# Patient Record
Sex: MALE | Race: WHITE | NOT HISPANIC OR LATINO | Employment: OTHER | URBAN - NONMETROPOLITAN AREA
[De-identification: names, ages, dates, MRNs, and addresses within clinical notes are randomized per-mention and may not be internally consistent; named-entity substitution may affect disease eponyms.]

---

## 2021-09-08 NOTE — PROGRESS NOTES
Patient: Layo Cisneros    YOB: 1945    Date: 09/14/2021    Primary Care Provider: Nigel Alicia MD    Chief Complaint   Patient presents with   • Hernia     Left inguinal        SUBJECTIVE:    History of present illness: Patient with a several year history of left inguinal hernia.  May have increased in size slightly but recently was lifting a large amount and had some pain a couple days later which only lasted a short period of time.  He is otherwise asymptomatic and easily reduces when he is recumbent.  No nausea or vomiting.  Incidentally, he had a right inguinal hernia repair as a child.    The following portions of the patient's history were reviewed and updated as appropriate: allergies, current medications, past family history, past medical history, past social history, past surgical history and problem list.    Review of Systems   Constitutional: Negative for chills, fever and unexpected weight change.   HENT: Negative for trouble swallowing and voice change.    Eyes: Negative for visual disturbance.   Respiratory: Negative for apnea, cough, chest tightness, shortness of breath and wheezing.    Cardiovascular: Negative for chest pain, palpitations and leg swelling.   Gastrointestinal: Positive for abdominal pain. Negative for abdominal distention, anal bleeding, blood in stool, constipation, diarrhea, nausea, rectal pain and vomiting.   Endocrine: Negative for cold intolerance and heat intolerance.   Genitourinary: Negative for difficulty urinating, dysuria, flank pain, scrotal swelling and testicular pain.   Musculoskeletal: Positive for back pain. Negative for gait problem and joint swelling.   Skin: Negative for color change, rash and wound.   Neurological: Negative for dizziness, syncope, speech difficulty, weakness, numbness and headaches.   Hematological: Negative for adenopathy. Does not bruise/bleed easily.   Psychiatric/Behavioral: Negative for confusion. The patient is not  "nervous/anxious.        History:  Past Medical History:   Diagnosis Date   • Cancer (CMS/HCC)     skin   • Hypertension        Past Surgical History:   Procedure Laterality Date   • BLADDER SURGERY     • EYE SURGERY      glaucoma   • HERNIA REPAIR  1950   • KIDNEY SURGERY Right        Family History   Problem Relation Age of Onset   • Cancer Mother         breast        Social History     Tobacco Use   • Smoking status: Former Smoker   • Smokeless tobacco: Former User   Vaping Use   • Vaping Use: Never used   Substance Use Topics   • Alcohol use: Not Currently   • Drug use: Defer       Allergies:  Allergies   Allergen Reactions   • Contrast Dye Anaphylaxis   • Bactrim [Sulfamethoxazole-Trimethoprim] Nausea And Vomiting   • Codeine Other (See Comments)     Syncope         Medications:    Current Outpatient Medications:   •  alfuzosin (UROXATRAL) 10 MG 24 hr tablet, Take 10 mg by mouth Daily., Disp: , Rfl:   •  Ascorbic Acid (HIPOLITO-C PO), Take  by mouth., Disp: , Rfl:   •  Bergamot Oil oil, , Disp: , Rfl:   •  famotidine (PEPCID) 10 MG tablet, Take 10 mg by mouth 2 (Two) Times a Day., Disp: , Rfl:   •  finasteride (PROSCAR) 5 MG tablet, Take 5 mg by mouth Daily., Disp: , Rfl:   •  fluticasone (FLONASE) 50 MCG/ACT nasal spray, 2 sprays into the nostril(s) as directed by provider Daily., Disp: , Rfl:   •  NETTLE-PYGEUM AFRICANUM PO, Take  by mouth., Disp: , Rfl:   •  Omega-3 Fatty Acids (fish oil) 1000 MG capsule capsule, Take  by mouth Daily With Breakfast., Disp: , Rfl:   •  Probiotic Product (PROBIOTIC-10 PO), Take  by mouth., Disp: , Rfl:   •  Saw Palmetto, Serenoa repens, (SAW PALMETTO PO), Take  by mouth., Disp: , Rfl:     OBJECTIVE:    Vital Signs:   Vitals:    09/14/21 0909   BP: 128/74   Pulse: 87   Temp: 97.7 °F (36.5 °C)   TempSrc: Temporal   SpO2: (!) 87%   Weight: 83.5 kg (184 lb)   Height: 180.3 cm (71\")       Physical Exam:   General Appearance:    Alert, cooperative, in no acute distress   Head:    " Normocephalic, without obvious abnormality, atraumatic   Eyes:            Normal.  No scleral icterus.  PERRLA    Lungs:     Clear to auscultation,respirations regular, even and                  unlabored    Heart:    Regular rhythm and normal rate, normal S1 and S2, no            murmur   Abdomen:     Normal bowel sounds, no masses, no organomegaly, soft        non-tender, non-distended, no guarding, reducible left inguinal hernia.  No definite right inguinal hernia but there are postoperative changes there.   Extremities:   Moves all extremities well, no edema, no cyanosis, no             redness   Skin:   No bleeding, bruising or rash   Neurologic:   Normal without gross deficits.   Psychiatric: No evidence of depression or anxiety          Results Review:   None    Review of Systems was reviewed and confirmed as accurate as documented by the MA.    ASSESSMENT/PLAN:    1. Non-recurrent unilateral inguinal hernia without obstruction or gangrene        Patient with a left inguinal hernia currently reducible.  I explained the diagnosis to him.  I also explained to him that he has the option of observation versus repair.  With observation he understands the risks which include worsening of the hernia, intestinal obstruction/incarceration etc. With repair he understands the procedure as well as using permanent mesh during the procedure for the repair.  He understands the risks of bleeding, infection, recurrence, pain including chronic pain, numbness, mesh issues including pain and infection etc. At this time he wishes to have this repaired but wants to call us back to schedule a suitable timing for that.  He will call me if he has any further issues    Electronically signed by Ish English MD  09/14/21

## 2021-09-14 ENCOUNTER — OFFICE VISIT (OUTPATIENT)
Dept: SURGERY | Facility: CLINIC | Age: 76
End: 2021-09-14

## 2021-09-14 VITALS
DIASTOLIC BLOOD PRESSURE: 74 MMHG | WEIGHT: 184 LBS | SYSTOLIC BLOOD PRESSURE: 128 MMHG | TEMPERATURE: 97.7 F | BODY MASS INDEX: 25.76 KG/M2 | HEIGHT: 71 IN | OXYGEN SATURATION: 87 % | HEART RATE: 87 BPM

## 2021-09-14 DIAGNOSIS — K40.90 NON-RECURRENT UNILATERAL INGUINAL HERNIA WITHOUT OBSTRUCTION OR GANGRENE: Primary | ICD-10-CM

## 2021-09-14 PROCEDURE — 99204 OFFICE O/P NEW MOD 45 MIN: CPT | Performed by: SURGERY

## 2021-09-14 RX ORDER — FLUTICASONE PROPIONATE 50 MCG
2 SPRAY, SUSPENSION (ML) NASAL DAILY
COMMUNITY

## 2021-09-14 RX ORDER — FAMOTIDINE 10 MG
10 TABLET ORAL 2 TIMES DAILY
COMMUNITY

## 2021-09-14 RX ORDER — FINASTERIDE 5 MG/1
5 TABLET, FILM COATED ORAL DAILY
COMMUNITY

## 2021-09-14 RX ORDER — CHLORAL HYDRATE 500 MG
CAPSULE ORAL
COMMUNITY

## 2021-09-14 RX ORDER — ALFUZOSIN HYDROCHLORIDE 10 MG/1
10 TABLET, EXTENDED RELEASE ORAL DAILY
COMMUNITY
End: 2022-05-02 | Stop reason: ALTCHOICE

## 2021-09-14 RX ORDER — BERGAMOT OIL
OIL (ML) MISCELLANEOUS
COMMUNITY

## 2022-05-02 ENCOUNTER — OFFICE VISIT (OUTPATIENT)
Dept: SURGERY | Facility: CLINIC | Age: 77
End: 2022-05-02

## 2022-05-02 VITALS
WEIGHT: 180.8 LBS | HEIGHT: 71 IN | HEART RATE: 76 BPM | BODY MASS INDEX: 25.31 KG/M2 | OXYGEN SATURATION: 98 % | DIASTOLIC BLOOD PRESSURE: 80 MMHG | RESPIRATION RATE: 18 BRPM | SYSTOLIC BLOOD PRESSURE: 140 MMHG | TEMPERATURE: 98.2 F

## 2022-05-02 DIAGNOSIS — K40.90 NON-RECURRENT UNILATERAL INGUINAL HERNIA WITHOUT OBSTRUCTION OR GANGRENE: Primary | ICD-10-CM

## 2022-05-02 PROCEDURE — 99213 OFFICE O/P EST LOW 20 MIN: CPT | Performed by: SURGERY

## 2022-05-02 RX ORDER — TESTOSTERONE CYPIONATE 200 MG/ML
INJECTION, SOLUTION INTRAMUSCULAR
COMMUNITY
Start: 2021-12-09

## 2022-05-02 RX ORDER — LISINOPRIL 5 MG/1
TABLET ORAL
COMMUNITY
Start: 2022-03-01

## 2022-05-02 RX ORDER — LIOTHYRONINE SODIUM 25 UG/1
TABLET ORAL
COMMUNITY
Start: 2022-04-05

## 2022-05-02 RX ORDER — ALFUZOSIN HYDROCHLORIDE 10 MG/1
1 TABLET, EXTENDED RELEASE ORAL DAILY
COMMUNITY

## 2022-05-02 RX ORDER — LEVOTHYROXINE SODIUM 50 MCG
TABLET ORAL
COMMUNITY
Start: 2022-04-05

## 2022-05-02 RX ORDER — NIACIN 1000 MG/1
TABLET, EXTENDED RELEASE ORAL
COMMUNITY
Start: 2022-03-01

## 2022-05-02 RX ORDER — AMLODIPINE BESYLATE 2.5 MG/1
TABLET ORAL
COMMUNITY
Start: 2022-04-05

## 2022-05-02 NOTE — PROGRESS NOTES
Patient: Layo Cisneros    YOB: 1945    Date: 05/02/2022    Primary Care Provider: Nigel Alicia MD    Chief Complaint   Patient presents with   • Hernia     Left inguinal       SUBJECTIVE:    History of present illness: Patient here in follow-up to discuss his left inguinal hernia repair.  He complains of no new issues.    The following portions of the patient's history were reviewed and updated as appropriate: allergies, current medications, past family history, past medical history, past social history, past surgical history and problem list.    Review of Systems   Constitutional: Negative for activity change, chills, fever and unexpected weight change.   HENT: Negative for hearing loss, trouble swallowing and voice change.    Eyes: Negative for visual disturbance.   Respiratory: Negative for apnea, cough, chest tightness, shortness of breath and wheezing.    Cardiovascular: Negative for chest pain, palpitations and leg swelling.   Gastrointestinal: Negative for abdominal distention, abdominal pain, anal bleeding, blood in stool, constipation, diarrhea, nausea, rectal pain and vomiting.   Endocrine: Negative for cold intolerance and heat intolerance.   Genitourinary: Negative for difficulty urinating, dysuria and flank pain.   Musculoskeletal: Negative for back pain and gait problem.   Skin: Negative for color change, rash and wound.   Neurological: Negative for dizziness, syncope, speech difficulty, weakness, light-headedness, numbness and headaches.   Hematological: Negative for adenopathy. Does not bruise/bleed easily.   Psychiatric/Behavioral: Negative for confusion. The patient is not nervous/anxious.        History:  Past Medical History:   Diagnosis Date   • Cancer (HCC)     skin   • Hypertension        Past Surgical History:   Procedure Laterality Date   • BLADDER SURGERY     • EYE SURGERY      glaucoma   • HERNIA REPAIR  1950   • KIDNEY SURGERY Right        Family History   Problem  Relation Age of Onset   • Cancer Mother         breast        Social History     Tobacco Use   • Smoking status: Former Smoker   • Smokeless tobacco: Former User   Vaping Use   • Vaping Use: Never used   Substance Use Topics   • Alcohol use: Not Currently   • Drug use: Defer       Allergies:  Allergies   Allergen Reactions   • Contrast Dye Anaphylaxis   • Iodinated Diagnostic Agents Anaphylaxis   • Iodine Anaphylaxis   • Sulfamethoxazole-Trimethoprim Nausea And Vomiting   • Alprazolam Unknown - Low Severity   • Amoxicillin-Pot Clavulanate Other (See Comments)     Loose stool   • Duloxetine Other (See Comments)     Tired, sweats   • Gemfibrozil GI Intolerance   • Sertraline Unknown - Low Severity   • Statins Other (See Comments)     Elevated CPK   • Venlafaxine Other (See Comments)     Decreased libido   • Codeine Other (See Comments)     Syncope    Passed out  Passes out         Medications:    Current Outpatient Medications:   •  alfuzosin (UROXATRAL) 10 MG 24 hr tablet, Take 1 tablet by mouth Daily., Disp: , Rfl:   •  amLODIPine (NORVASC) 2.5 MG tablet, , Disp: , Rfl:   •  Ascorbic Acid (HIPOLITO-C PO), Take  by mouth., Disp: , Rfl:   •  Bergamot Oil oil, , Disp: , Rfl:   •  finasteride (PROSCAR) 5 MG tablet, Take 5 mg by mouth Daily., Disp: , Rfl:   •  fluticasone (FLONASE) 50 MCG/ACT nasal spray, 2 sprays into the nostril(s) as directed by provider Daily., Disp: , Rfl:   •  liothyronine (CYTOMEL) 25 MCG tablet, , Disp: , Rfl:   •  lisinopril (PRINIVIL,ZESTRIL) 5 MG tablet, , Disp: , Rfl:   •  niacin (NIASPAN) 1000 MG CR tablet, , Disp: , Rfl:   •  Nutritional Supplements (DHEA PO), TAKE ONE CAPSULE BY MOUTH EVERY MORNING, Disp: , Rfl:   •  Omega-3 Fatty Acids (fish oil) 1000 MG capsule capsule, Take  by mouth Daily With Breakfast., Disp: , Rfl:   •  Probiotic Product (PROBIOTIC-10 PO), Take  by mouth., Disp: , Rfl:   •  Saw Palmetto, Serenoa repens, (SAW PALMETTO PO), Take  by mouth., Disp: , Rfl:   •  Synthroid 50  "MCG tablet, , Disp: , Rfl:   •  Testosterone Cypionate (DEPOTESTOTERONE CYPIONATE) 200 MG/ML injection, Inject 1ml into buttock weekly, Disp: , Rfl:   •  famotidine (PEPCID) 10 MG tablet, Take 10 mg by mouth 2 (Two) Times a Day., Disp: , Rfl:     OBJECTIVE:    Vital Signs:   Vitals:    05/02/22 1418   BP: 140/80   Pulse: 76   Resp: 18   Temp: 98.2 °F (36.8 °C)   TempSrc: Temporal   SpO2: 98%   Weight: 82 kg (180 lb 12.8 oz)   Height: 180.3 cm (71\")       Physical Exam:   General Appearance:    Alert, cooperative, in no acute distress   Head:    Normocephalic, without obvious abnormality, atraumatic   Eyes:            Normal.  No scleral icterus.  PERRLA    Lungs:     Clear to auscultation,respirations regular, even and                  unlabored    Heart:    Regular rhythm and normal rate, normal S1 and S2, no            murmur   Abdomen:     Normal bowel sounds, no masses, no organomegaly, soft        non-tender, non-distended, no guarding, reducible left inguinal hernia.  No evidence of hernia on the right side.   Extremities:   Moves all extremities well, no edema, no cyanosis, no             redness   Skin:   No bleeding, bruising or rash   Neurologic:   Normal without gross deficits.   Psychiatric: No evidence of depression or anxiety          Results Review:   None    Review of Systems was reviewed and confirmed as accurate as documented by the MA.    ASSESSMENT/PLAN:    1. Non-recurrent unilateral inguinal hernia without obstruction or gangrene        Patient wishes to proceed and schedule a left inguinal hernia repair.  Patient understands the procedure as well as the risks of bleeding, infection, recurrence, pain including chronic pain, mesh issues including pain and infection etc.  He understands these and wishes to proceed.    Electronically signed by Ish English MD  05/02/22            "

## 2022-05-03 DIAGNOSIS — Z01.818 PREOPERATIVE CLEARANCE: Primary | ICD-10-CM

## 2022-05-18 ENCOUNTER — TELEPHONE (OUTPATIENT)
Dept: SURGERY | Facility: CLINIC | Age: 77
End: 2022-05-18

## 2022-05-23 ENCOUNTER — OUTSIDE FACILITY SERVICE (OUTPATIENT)
Dept: SURGERY | Facility: CLINIC | Age: 77
End: 2022-05-23

## 2022-05-23 DIAGNOSIS — G89.18 POST-OP PAIN: Primary | ICD-10-CM

## 2022-05-23 PROCEDURE — 49505 PRP I/HERN INIT REDUC >5 YR: CPT | Performed by: SURGERY

## 2022-05-23 RX ORDER — IBUPROFEN 600 MG/1
600 TABLET ORAL EVERY 6 HOURS PRN
Qty: 14 TABLET | Refills: 0 | Status: SHIPPED | OUTPATIENT
Start: 2022-05-23 | End: 2023-05-23

## 2022-05-23 RX ORDER — HYDROCODONE BITARTRATE AND ACETAMINOPHEN 5; 325 MG/1; MG/1
1-2 TABLET ORAL EVERY 4 HOURS PRN
Qty: 10 TABLET | Refills: 0 | Status: SHIPPED | OUTPATIENT
Start: 2022-05-23 | End: 2022-05-25

## 2022-06-01 NOTE — PROGRESS NOTES
"Patient: Layo Cisneros    YOB: 1945    Date: 06/02/2022    Primary Care Provider: Nigel Alicia MD    Chief Complaint   Patient presents with   • Post-op Follow-up     [facility]       History of present illness:  I saw the patient in the office today as a followup from their recent hernia repair.  They state that they have done well and are having no complaints.    Vital Signs:   Vitals:    06/02/22 1513   BP: 138/82   Pulse: 94   Temp: 98.9 °F (37.2 °C)   TempSrc: Temporal   SpO2: 94%   Weight: 82 kg (180 lb 12.4 oz)   Height: 180.3 cm (70.98\")       Physical Exam:   General Appearance:    Alert, cooperative, in no acute distress   Abdomen:     no masses, no organomegaly, soft non-tender, non-distended, no guarding, wounds are well healed, no evidence of recurrent hernia     Assessment / Plan:    1. Postoperative visit        Overall he is doing quite well.  Recovering nicely.  I gave him activity instructions.  He will follow-up with me as needed.    Electronically signed by Ish English MD  06/02/22                "

## 2022-06-02 ENCOUNTER — OFFICE VISIT (OUTPATIENT)
Dept: SURGERY | Facility: CLINIC | Age: 77
End: 2022-06-02

## 2022-06-02 VITALS
TEMPERATURE: 98.9 F | DIASTOLIC BLOOD PRESSURE: 82 MMHG | WEIGHT: 180.78 LBS | SYSTOLIC BLOOD PRESSURE: 138 MMHG | HEIGHT: 71 IN | HEART RATE: 94 BPM | OXYGEN SATURATION: 94 % | BODY MASS INDEX: 25.31 KG/M2

## 2022-06-02 DIAGNOSIS — Z48.89 POSTOPERATIVE VISIT: Primary | ICD-10-CM

## 2022-06-02 PROCEDURE — 99024 POSTOP FOLLOW-UP VISIT: CPT | Performed by: SURGERY

## 2024-08-16 ENCOUNTER — OFFICE VISIT (OUTPATIENT)
Age: 79
End: 2024-08-16

## 2024-08-16 VITALS
SYSTOLIC BLOOD PRESSURE: 153 MMHG | HEART RATE: 83 BPM | OXYGEN SATURATION: 95 % | TEMPERATURE: 98.6 F | BODY MASS INDEX: 23.98 KG/M2 | DIASTOLIC BLOOD PRESSURE: 82 MMHG | HEIGHT: 72 IN | WEIGHT: 177 LBS

## 2024-08-16 DIAGNOSIS — M79.602 LEFT ARM PAIN: Primary | ICD-10-CM

## 2024-08-16 RX ORDER — CHLORAL HYDRATE 500 MG
CAPSULE ORAL
COMMUNITY

## 2024-08-16 RX ORDER — AMLODIPINE BESYLATE 2.5 MG/1
TABLET ORAL
COMMUNITY
Start: 2022-03-21

## 2024-08-16 RX ORDER — FLUTICASONE PROPIONATE 50 MCG
SPRAY, SUSPENSION (ML) NASAL
COMMUNITY
Start: 2012-06-21

## 2024-08-16 RX ORDER — FINASTERIDE 5 MG/1
5 TABLET, FILM COATED ORAL DAILY
COMMUNITY
Start: 2021-08-02

## 2024-08-16 RX ORDER — LISINOPRIL 5 MG/1
TABLET ORAL
COMMUNITY
Start: 2021-12-09

## 2024-08-16 RX ORDER — FAMOTIDINE 10 MG
10 TABLET ORAL 2 TIMES DAILY
COMMUNITY

## 2024-08-16 RX ORDER — FENOFIBRATE 160 MG/1
160 TABLET ORAL DAILY
COMMUNITY
Start: 2013-01-02

## 2024-08-16 RX ORDER — DIAZEPAM 5 MG/1
5 TABLET ORAL
COMMUNITY
Start: 2011-08-30

## 2024-08-16 NOTE — PROGRESS NOTES
Matt Yao (:  1945) is a 79 y.o. male,New patient, here for evaluation of the following chief complaint(s):  Shoulder Pain (Patient c/o left side shoulder and jaw pain that started 1 week ago) and Jaw Pain      ASSESSMENT/PLAN:  1. Left arm pain    -advised pt that my concern that this can be cardiac symptoms.pt was advised to go to the ER now for more evaluation and treatment.pt in agreement.pt refused squad transport.       No follow-ups on file.    SUBJECTIVE/OBJECTIVE:  Pt c/o one wk h/o left arm pain that radiate to left neck and jaw,no injury,pt denies CP,no SOB or diaphoresis,pt wake up with it,no relation to effort      History provided by:  Patient  Shoulder Pain   This is a new problem. The current episode started in the past 7 days. The pain is mild. Pertinent negatives include no fever, inability to bear weight, joint locking, joint swelling, limited range of motion, numbness, stiffness or tingling.       Vitals:    24 1450 24 1507   BP: (!) 154/79 (!) 153/82   Pulse: 83    Temp: 98.6 °F (37 °C)    TempSrc: Oral    SpO2: 95%    Weight: 80.3 kg (177 lb)    Height: 1.829 m (6')        Review of Systems   Constitutional:  Negative for fever.   Musculoskeletal:  Negative for stiffness.   Neurological:  Negative for tingling and numbness.       Physical Exam  Constitutional:       General: He is not in acute distress.  HENT:      Nose: No congestion.      Mouth/Throat:      Mouth: Mucous membranes are moist.      Pharynx: No posterior oropharyngeal erythema.   Eyes:      Conjunctiva/sclera: Conjunctivae normal.      Pupils: Pupils are equal, round, and reactive to light.   Cardiovascular:      Rate and Rhythm: Normal rate and regular rhythm.   Pulmonary:      Effort: Pulmonary effort is normal. No respiratory distress.      Breath sounds: Normal breath sounds.   Musculoskeletal:         General: No swelling, tenderness (no shoulder or arm tenderness,has full ROM of left shoulder.) or